# Patient Record
Sex: MALE | Race: WHITE | Employment: OTHER | URBAN - METROPOLITAN AREA
[De-identification: names, ages, dates, MRNs, and addresses within clinical notes are randomized per-mention and may not be internally consistent; named-entity substitution may affect disease eponyms.]

---

## 2019-04-29 ENCOUNTER — TELEPHONE (OUTPATIENT)
Dept: GASTROENTEROLOGY | Facility: CLINIC | Age: 49
End: 2019-04-29

## 2019-05-23 ENCOUNTER — TELEPHONE (OUTPATIENT)
Dept: GASTROENTEROLOGY | Facility: CLINIC | Age: 49
End: 2019-05-23

## 2019-08-27 ENCOUNTER — TELEPHONE (OUTPATIENT)
Dept: GASTROENTEROLOGY | Facility: CLINIC | Age: 49
End: 2019-08-27

## 2019-09-10 ENCOUNTER — OFFICE VISIT (OUTPATIENT)
Dept: GASTROENTEROLOGY | Facility: CLINIC | Age: 49
End: 2019-09-10
Payer: COMMERCIAL

## 2019-09-10 VITALS — DIASTOLIC BLOOD PRESSURE: 110 MMHG | HEART RATE: 68 BPM | WEIGHT: 298 LBS | SYSTOLIC BLOOD PRESSURE: 182 MMHG

## 2019-09-10 DIAGNOSIS — B96.81 HELICOBACTER PYLORI GASTRITIS: ICD-10-CM

## 2019-09-10 DIAGNOSIS — K29.70 HELICOBACTER PYLORI GASTRITIS: ICD-10-CM

## 2019-09-10 DIAGNOSIS — R12 HEARTBURN: Primary | ICD-10-CM

## 2019-09-10 PROCEDURE — 99213 OFFICE O/P EST LOW 20 MIN: CPT | Performed by: INTERNAL MEDICINE

## 2019-09-10 RX ORDER — CLARITHROMYCIN 500 MG/1
500 TABLET, COATED ORAL EVERY 12 HOURS SCHEDULED
Qty: 28 TABLET | Refills: 0 | Status: SHIPPED | OUTPATIENT
Start: 2019-09-10 | End: 2019-09-24

## 2019-09-10 RX ORDER — OMEPRAZOLE 20 MG/1
20 TABLET, DELAYED RELEASE ORAL 2 TIMES DAILY
Qty: 28 TABLET | Refills: 0 | Status: SHIPPED | OUTPATIENT
Start: 2019-09-10 | End: 2019-09-24

## 2019-09-10 RX ORDER — MELOXICAM 15 MG/1
TABLET ORAL
COMMUNITY

## 2019-09-10 RX ORDER — LOSARTAN POTASSIUM 25 MG/1
25 TABLET ORAL DAILY
Refills: 3 | COMMUNITY
Start: 2019-07-08

## 2019-09-10 RX ORDER — AMOXICILLIN 500 MG/1
1000 TABLET, FILM COATED ORAL 2 TIMES DAILY
Qty: 56 TABLET | Refills: 0 | Status: SHIPPED | OUTPATIENT
Start: 2019-09-10 | End: 2019-09-24

## 2019-09-10 NOTE — PROGRESS NOTES
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  Diagnosed with H pylori on EGD for heartburn  Currently not taking any cholesterol medicine  No allergies  Only has mild occasional heartburn when he eats bread  No other GI complaints at the present time  Review of Systems:   Constitutional: No fever or chills, feels well, no tiredness, no recent weight gain or weight loss  HENT: No complaints of earache, no hearing loss, no nosebleeds, no nasal discharge, no sore throat, no hoarseness  Eyes: No complaints of eye pain, no red eyes, no discharge from eyes, no itchy eyes  Cardiovascular: No complaints of slow heart rate, no fast heart rate, no chest pain, no palpitations, no leg claudication, no lower extremity edema  Respiratory: No complaints of shortness of breath, no wheezing, no cough, no SOB on exertion, no orthopnea  Gastrointestinal: As noted in HPI  Genitourinary: No complaints of dysuria, no incontinence, no hesitancy, no nocturia  Musculoskeletal: No complaints of arthralgia, no myalgias, no joint swelling or stiffness, no limb pain or swelling  Neurological: No complaints of headache, no confusion, no convulsions, no numbness or tingling, no dizziness or fainting, no limb weakness, no difficulty walking  Skin: No complaints of skin rash or skin lesions, no itching, no skin wound, no dry skin  Hematological/Lymphatic: No complaints of swollen glands, does not bleed easy  Allergic/Immunologic: No immunocompromised state  Endocrine:  No complaints of polyuria, no polydipsia  Psychiatric/Behavioral: is not suicidal, no sleep disturbances, no anxiety or depression, no change in personality, no emotional problems         Historical Information   Past Medical History:   Diagnosis Date    Hypertension     Obesity      Past Surgical History:   Procedure Laterality Date    CHOLECYSTECTOMY      RHINOPLASTY      TONSILLECTOMY       Social History   Social History     Substance and Sexual Activity   Alcohol Use Yes    Comment: Social     Social History     Substance and Sexual Activity   Drug Use Never     Social History     Tobacco Use   Smoking Status Former Smoker    Last attempt to quit: 9/10/2013    Years since quittin 0   Smokeless Tobacco Never Used     Family History   Problem Relation Age of Onset    Cancer Mother     Ovarian cancer Mother          Current Medications: has a current medication list which includes the following prescription(s): losartan, amoxicillin, clarithromycin, meloxicam, and omeprazole  Vital Signs: BP (!) 182/110   Pulse 68   Wt 135 kg (298 lb)       Physical Exam:   Constitutional  General Appearance: No acute distress, well appearing and well nourished, obese  Head  Normocephalic  Eyes  Conjunctivae and lids: No swelling, erythema, or discharge  Pupils and irises: Equal, round and reactive to light  Ears, Nose, Mouth, and Throat  External inspection of ears and nose: Normal  Nasal mucosa, septum and turbinates: Normal without edema or erythema/   Oropharynx: Normal with no erythema, edema, exudate or lesions  Neck  Normal range of motion  Neck supple  Cardiovascular  Auscultation of the heart: Normal rate and rhythm, normal S1 and S2 without murmurs  Examination of the extremities for edema and/or varicosities: Normal  Pulmonary/Chest  Respiratory effort: No increased work of breathing or signs of respiratory distress  Auscultation of lungs: Clear to auscultation, equal breath sounds bilaterally, no wheezes, rales, no rhonchi  Abdomen  Abdomen: Non-tender, no masses  Liver and spleen: No hepatomegaly or splenomegaly  Musculoskeletal  Gait and station: normal   Digits and Nails: normal without clubbing or cyanosis    Inspection/palpation of joints, bones, and muscles: Normal  Neurological  No nystagmus or asterixis  Skin  Skin and subcutaneous tissue: Normal without rashes or lesions  Lymphatic  Palpation of the lymph nodes in neck: No lymphadenopathy  Psychiatric  Orientation to person, place and time: Normal   Mood and affect: Normal          Labs:  No results found for: ALT, AST, BUN, CALCIUM, CL, CHOL, CO2, CREATININE, GFRAA, GFRNONAA, HDL, HCT, HGB, HGBA1C, LDL, MG, PHOS, PLT, K, PSA, NA, TRIG, WBC      X-Rays & Procedures:   No orders to display           ______________________________________________________________________      Assessment & Plan:     Diagnoses and all orders for this visit:    Heartburn    Helicobacter pylori gastritis  -     amoxicillin (AMOXIL) 500 MG tablet; Take 2 tablets (1,000 mg total) by mouth 2 (two) times a day for 14 days  -     clarithromycin (BIAXIN) 500 mg tablet; Take 1 tablet (500 mg total) by mouth every 12 (twelve) hours for 14 days  -     omeprazole (PriLOSEC OTC) 20 MG tablet; Take 1 tablet (20 mg total) by mouth 2 (two) times a day for 14 days  -     H  pylori antigen, stool; Future      Patient will be treated for H pylori infection  Follow-up stool be done

## 2024-05-24 ENCOUNTER — TELEPHONE (OUTPATIENT)
Age: 54
End: 2024-05-24